# Patient Record
Sex: MALE | Race: WHITE
[De-identification: names, ages, dates, MRNs, and addresses within clinical notes are randomized per-mention and may not be internally consistent; named-entity substitution may affect disease eponyms.]

---

## 2020-05-06 ENCOUNTER — HOSPITAL ENCOUNTER (EMERGENCY)
Dept: HOSPITAL 94 - ER | Age: 80
Discharge: HOME | End: 2020-05-06
Payer: MEDICARE

## 2020-05-06 VITALS — BODY MASS INDEX: 22.81 KG/M2 | WEIGHT: 159.33 LBS | HEIGHT: 70 IN

## 2020-05-06 VITALS — DIASTOLIC BLOOD PRESSURE: 67 MMHG | SYSTOLIC BLOOD PRESSURE: 167 MMHG

## 2020-05-06 DIAGNOSIS — R25.2: Primary | ICD-10-CM

## 2020-05-06 LAB
ALBUMIN SERPL BCP-MCNC: 3.6 G/DL (ref 3.4–5)
ANION GAP SERPL CALCULATED.3IONS-SCNC: 6 MMOL/L (ref 8–16)
BASOPHILS # BLD AUTO: 0.1 X10'3 (ref 0–0.2)
BASOPHILS NFR BLD AUTO: 0.8 % (ref 0–1)
BUN SERPL-MCNC: 19 MG/DL (ref 7–18)
BUN/CREAT SERPL: 24.4 (ref 5.4–32)
CALCIUM SERPL-MCNC: 8.5 MG/DL (ref 8.5–10.1)
CHLORIDE SERPL-SCNC: 107 MMOL/L (ref 99–107)
CO2 SERPL-SCNC: 29.5 MMOL/L (ref 24–32)
CREAT SERPL-MCNC: 0.78 MG/DL (ref 0.6–1.1)
EOSINOPHIL # BLD AUTO: 0.1 X10'3 (ref 0–0.9)
EOSINOPHIL NFR BLD AUTO: 0.8 % (ref 0–6)
ERYTHROCYTE [DISTWIDTH] IN BLOOD BY AUTOMATED COUNT: 15 % (ref 11.5–14.5)
GFR SERPL CREATININE-BSD FRML MDRD: > 90 ML/MIN
GLUCOSE SERPL-MCNC: 232 MG/DL (ref 70–104)
HCT VFR BLD AUTO: 41.2 % (ref 42–52)
HGB BLD-MCNC: 13.4 G/DL (ref 14–17.9)
LYMPHOCYTES # BLD AUTO: 1.3 X10'3 (ref 1.1–4.8)
LYMPHOCYTES NFR BLD AUTO: 10.9 % (ref 21–51)
MCH RBC QN AUTO: 30 PG (ref 27–31)
MCHC RBC AUTO-ENTMCNC: 32.6 G/DL (ref 33–36.5)
MCV RBC AUTO: 92 FL (ref 78–98)
MONOCYTES # BLD AUTO: 1.1 X10'3 (ref 0–0.9)
MONOCYTES NFR BLD AUTO: 9 % (ref 2–12)
NEUTROPHILS # BLD AUTO: 9.2 X10'3 (ref 1.8–7.7)
NEUTROPHILS NFR BLD AUTO: 78.5 % (ref 42–75)
PLATELET # BLD AUTO: 268 X10'3 (ref 140–440)
PMV BLD AUTO: 7.8 FL (ref 7.4–10.4)
POTASSIUM SERPL-SCNC: 3.3 MMOL/L (ref 3.5–5.1)
RBC # BLD AUTO: 4.48 X10'6 (ref 4.7–6.1)
SODIUM SERPL-SCNC: 142 MMOL/L (ref 135–145)
WBC # BLD AUTO: 11.8 X10'3 (ref 4.5–11)

## 2020-05-06 PROCEDURE — 85025 COMPLETE CBC W/AUTO DIFF WBC: CPT

## 2020-05-06 PROCEDURE — 36415 COLL VENOUS BLD VENIPUNCTURE: CPT

## 2020-05-06 PROCEDURE — 80048 BASIC METABOLIC PNL TOTAL CA: CPT

## 2020-05-06 PROCEDURE — 99284 EMERGENCY DEPT VISIT MOD MDM: CPT

## 2020-05-06 NOTE — NUR
Pt's family members brought the patient to the ED for evaluation. Pt was seen 
at [facility] in [place] for tremors in the right upper extremity and he 
appears to be hitting himself repeatedly. Pt's family members report the 
patient was released with a referral for mental health follow up but they want 
him to be evaluated further for medical concerns. Pt's brother-in-law Roderick's 
number given to the triage nurse to reach the him or the spouse regarding more 
details about the patient's situation.

## 2020-07-15 ENCOUNTER — HOSPITAL ENCOUNTER (INPATIENT)
Dept: HOSPITAL 94 - ADULT MH | Age: 80
LOS: 5 days | Discharge: HOME | DRG: 885 | End: 2020-07-20
Attending: PSYCHIATRY & NEUROLOGY | Admitting: PSYCHIATRY & NEUROLOGY
Payer: MEDICARE

## 2020-07-15 VITALS — HEIGHT: 70 IN | BODY MASS INDEX: 23.86 KG/M2 | WEIGHT: 166.67 LBS

## 2020-07-15 VITALS — SYSTOLIC BLOOD PRESSURE: 156 MMHG | DIASTOLIC BLOOD PRESSURE: 78 MMHG

## 2020-07-15 VITALS — SYSTOLIC BLOOD PRESSURE: 162 MMHG | DIASTOLIC BLOOD PRESSURE: 78 MMHG

## 2020-07-15 DIAGNOSIS — Z87.891: ICD-10-CM

## 2020-07-15 DIAGNOSIS — F39: Primary | ICD-10-CM

## 2020-07-15 DIAGNOSIS — R45.851: ICD-10-CM

## 2020-07-15 DIAGNOSIS — M54.9: ICD-10-CM

## 2020-07-15 DIAGNOSIS — E11.9: ICD-10-CM

## 2020-07-15 DIAGNOSIS — Z79.4: ICD-10-CM

## 2020-07-15 DIAGNOSIS — E03.9: ICD-10-CM

## 2020-07-15 DIAGNOSIS — F32.9: ICD-10-CM

## 2020-07-15 DIAGNOSIS — G47.00: ICD-10-CM

## 2020-07-15 DIAGNOSIS — G25.81: ICD-10-CM

## 2020-07-15 PROCEDURE — 87081 CULTURE SCREEN ONLY: CPT

## 2020-07-15 PROCEDURE — 36415 COLL VENOUS BLD VENIPUNCTURE: CPT

## 2020-07-15 PROCEDURE — 83036 HEMOGLOBIN GLYCOSYLATED A1C: CPT

## 2020-07-15 PROCEDURE — 80061 LIPID PANEL: CPT

## 2020-07-15 PROCEDURE — 84443 ASSAY THYROID STIM HORMONE: CPT

## 2020-07-15 PROCEDURE — 99285 EMERGENCY DEPT VISIT HI MDM: CPT

## 2020-07-15 PROCEDURE — 82948 REAGENT STRIP/BLOOD GLUCOSE: CPT

## 2020-07-15 NOTE — NUR
Noted that pt on a CHO controlled diet with [facility] T2DM, A1c pending at this time. 

Will continue to follow and monitor need for DM education pending A1c results.


-------------------------------------------------------------------------------

Addendum: 07/15/20 at 1519 by Martina Bernardo RD

-------------------------------------------------------------------------------

Amended: Links added.

## 2020-07-15 NOTE — NUR
Patient continues to exhibit agitation. Patient complains of not sleeping and gets in a 
verbal argument and dispute with his roommate.  contacted by [facility], orders 
received for Ativan 2 mg, Zyprexa Zytis 10 mg now. This was given. In addition Dr. Prince gave 
an order to repeat the Zyprexa Zytis in one hour prn.

## 2020-07-15 NOTE — NUR
Patients night time glucose was 231. Per day shift RN night time Lantus dose of 30 units is 
to remain that dose, no adjustments. This was verified by Dr. Echeverria and Kathy, Norma 
RN.

## 2020-07-15 NOTE — NUR
ADMIT NOTE



Patient arrived at [facility] at 1445. Patient was escorted via wheelchair with Crystal, PCT and 
security. Patient presents as calm and cooperative. Patient showered and changed into green 
unit scrubs. Belongings were inventoried. Advisement was reviewed and given to patient, pt. 
voices understanding. Patient denies SI, states I was feeling like that because of these 
shakes and pt. shakes hand back and forth. Pt. states they gave me some kind of medicine 
and it helped, but unsure what medication it was. Pt. states I hope they figure it out, 
that is why I am here. Denies H/I, AH/VH. Noted some tremors in hands, but seems to be 
controllable. Pt. states I learned to deep breath and this seems to help. Reports some 
anxiety 4/10. 



Pt. is DM Type 1- he takes Novolog flexpen 4 units before each meal. POC blood glucose was 
165 upon admit to [facility]. Basaglar 30 units HS. Reports trouble falling asleep, recently 
started on Trazadone 100mg HS. Noted pt. to be a little unstable when walking and standing. 
Patient states he sometimes uses a FWW at home. Last BM was 7/14 HS and was hard. Pt. hard 
of hearing, has hearing in left ear.

## 2020-07-16 VITALS — SYSTOLIC BLOOD PRESSURE: 149 MMHG | DIASTOLIC BLOOD PRESSURE: 83 MMHG

## 2020-07-16 VITALS — DIASTOLIC BLOOD PRESSURE: 65 MMHG | SYSTOLIC BLOOD PRESSURE: 104 MMHG

## 2020-07-16 LAB
CHOLEST SERPL-MCNC: 132 MG/DL (ref 0–200)
CHOLEST/HDLC SERPL: 2.4 {RATIO} (ref 0–4.99)
HBA1C MFR BLD: 9.1 % (ref 4.5–6.2)
HDLC SERPL-MCNC: 56 MG/DL (ref 35–60)
LDLC SERPL DIRECT ASSAY-MCNC: 60 MG/DL (ref 50–100)
TRIGL SERPL-MCNC: 83 MG/DL (ref 20–135)

## 2020-07-16 RX ADMIN — Medication PRN G: at 07:36

## 2020-07-16 RX ADMIN — INSULIN LISPRO SCH UNITS: 100 INJECTION, SOLUTION INTRAVENOUS; SUBCUTANEOUS at 18:09

## 2020-07-16 RX ADMIN — LEVOTHYROXINE SODIUM SCH MCG: 175 TABLET ORAL at 07:36

## 2020-07-16 RX ADMIN — INSULIN LISPRO SCH UNITS: 100 INJECTION, SOLUTION INTRAVENOUS; SUBCUTANEOUS at 13:27

## 2020-07-16 RX ADMIN — INSULIN GLARGINE SCH UNIT: 100 INJECTION, SOLUTION SUBCUTANEOUS at 21:00

## 2020-07-16 RX ADMIN — INSULIN LISPRO SCH UNITS: 100 INJECTION, SOLUTION INTRAVENOUS; SUBCUTANEOUS at 07:00

## 2020-07-16 NOTE — NUR
Nursing Progress Note:



Legal hold:5150



Client on involuntary status for: DTS.



Report received from LANDEN Morrison with use of SBAR



Why are they here:

Patient arrived at [facility] at 1445. Patient was escorted via wheelchair with Crystal, PCT and 
security. Patient presents as calm and cooperative. Patient showered and changed into green 
unit scrubs. Belongings were inventoried. Advisement was reviewed and given to patient, pt. 
voices understanding. Patient denies SI, states I was feeling like that because of these 
shakes and pt. shakes hand back and forth. Pt. states they gave me some kind of medicine 
and it helped, but unsure what medication it was. Pt. states I hope they figure it out, 
that is why I am here. Denies H/I, AH/VH. Noted some tremors in hands, but seems to 



Assessment

What has happened this shift: This patient is in his room following shift change. He is 
supine in bed, he has a walker at bedside. Patient stands to greet this writer. Patient 
brings this writers attention to his right hand. Patient tells this writer that " the only 
reason I'm here is to get some rest, and to get my hand tremors under control." The patients 
speech is pressured. He makes direct eye contact. Patient is agitated, he tells this writer 
that he is supposed to get "some sort of medication for sleep, two large pills." The patient 
denies H/I, S/I, or any hallucinations. Patients evening blood sugar was 231. Patient 
reported to have eaten his full dinner. Lantus was given 30 units SQ. Late evening patient 
began experiencing more agitation. An Ativan 1mg was given PO. Trazadone 50 mg was given 
about 45 minutes prior for sleep without effect. Following Ativan this patient began 
exhibiting more agitation, he got into a verbal altercation with his roommate. Dr. Prince was 
contacted. Orders were given for Zyprexa Zytis 10 mg and Ativan 2mg. The patient took those 
medications as requested. The patient gradually went to sleep. Frequent rounding is being 
done to ensure patient safety. 





S/I, H/I: Patient denies. 

A/VH: Patient denies.

Sleep: Will tally at 0500 hours.

ADL's: Independent, patient uses walker.

Group attendance: None on day shift.

Were med's taken: Yes, patient is medication compliant.

Any med S/E: None noted.





Mental Status Exam



Appearance: Somewhat disheveled looking, wearing green scrubs.

Eye contact: Fair.

Behavior: Agitated, angry at times.

Speech: Pressured.

Mood:Depressed.

Affect: Flat.

Thought process: Minimizes need to be here. 

Thought Content: Repetitive statements about just being here to get rest and stop right hand 
tremors.

Cognition: Alert and oriented to place and year, not to situation.

Insight: Poor.

Judgment: Poor.







Interventions



PRN's used: Trazadone, Ativan, Zyprexa Zytis.





Therapeutic interventions: 1:1 assessment, maintained a safe and therapeutic environment, 
provided clear and simple instructions, monitored behavior and need for intervention, 
provided redirection/reassurance as needed, encouragement to perform personal hygiene, limit 
setting, positive reinforcement, Q 15 minute safety checks. 



Restraints/seclusion/emergency medication: N/A





Justification of Continued Inpatient Treatment: Pt continues to require a safe and 
supportive environment with medication adjustments to decrease risk of rehospitalization.

## 2020-07-16 NOTE — NUR
DM Consult: Pt A1C results 9.1. Written DM ed faxed to FRITZ and MARTHA selby/w RN regarding placing 
ed in pt chart so can have written ed materials once appropriate upon discharge.

-------------------------------------------------------------------------------

Addendum: 07/16/20 at 1029 by Lorenzo Lenz RD

-------------------------------------------------------------------------------

Amended: Links added.

## 2020-07-16 NOTE — NUR
Hypoglycemic Episode: Obtained pt's BS this AM and was 52, one Glucose-Shot administered per 
protocol and rechecked pt. in 15 min. BS was 69. Pt. was then able to eat 100% of his 
breakfast, rechecked BS and was 107. Held pt's scheduled 4units of Humalog, will endorse to 
MD and continue to monitor..

## 2020-07-16 NOTE — NUR
Nursing Progress Note:



Legal hold: 5150



Client on involuntary status for DTS



Report received from nurse with use of SBAR: LANDEN Chavez



Why are they here:  Pt. admitted from [facility] on a 5150  for DTS. On admission he 
denies SI, states I was feeling like that because of these shakes and pt. shakes hand back 
and forth. Pt. states they gave me some kind of medicine and it helped, but unsure what 
medication it was. Pt. states I hope they figure it out, that is why I am here. Denies 
H/I, AH/VH. Noted some tremors in hands, but seems to be controllable. Pt. states I learned 
to deep breath and this seems to help. Reports some anxiety 4/10. 



Assessment



What has happened this shift: Received pt. laying in bed awake at the beginning of the 
shift, this writer introduced self and established rapport. Pt. presents as cooperative, 
fatigued, restless, guarded, and withdrawn. 1:1 completed at bedside, pt. is A&O X3, states, 
"I'm here to get rid of my tremors and get my sleep straightened out." Pt. denies any S/I, 
H/I, or A/V/HA, however he does present with some paranoid thoughts. He makes the paranoid 
delusional statement, "Half the population is going to die from this coronavirus I think." 
Pt.'s thought process is circumstantial throughout the assessment, and he continues to 
perseverate on the insomnia he has been experiencing, states, "I lay there, think about 
things, and I can't sleep." Obtained new order from VANE Humphrey for Seroquel 50mg, MRX1 in one 
hour, will endorse to Noc shift.

S/I, H/I: Denies

A/VH: Denies, does not appear internally preoccupied

Sleep: Pt. is very preoccupied with chronic insomnia he has been experiencing for some time, 
however he reports that he was able to sleep last night after taking sleeping medication 
(got one time dose of Zyprexa 10mg and Ativan 2mg). Sleep hours are 6.

ADL's: Uses FWW

Group attendance: Yes

Were meds taken:Yes

Any med S/E:None





Mental Status Exam



Appearance: Neat, appropriately dressed

Eye contact: Good

Behavior: Cooperative, fatigued, restless, guarded, and withdrawn

Speech: Soft, delayed response at times

Mood: Guarded and withdrawn

Affect: Blunted

Thought process: Circumstantial

Thought Content: Paranoid delusional

Cognition: A&O X3 (not to reason here)

Insight: Poor

Judgment: Poor







Interventions



PRN's used: None

Therapeutic interventions: Introduced self and established rapport, ensured contract for 
safety, maintained a safe and therapeutic environment, provided clear and simple 
instruction, monitored behaviors and need for intervention, maintained fall precautions and 
Q 15min safety checks. 

Restraints/seclusion/emergency medication: N/A





Justification of Continued Inpatient Treatment: Pt. requires interruption of current crisis, 
medication adjustments, and a safe and supportive environment.

## 2020-07-16 NOTE — NUR
Patient is awake and ambulatory. Patient complains of agitation and inability to sleep. 
Patient has had Trazatone 100 mg, Seroquel 50 mg, and Ativan 1 mg. VANE Bell was called. 
Orders received for Zyprexa Zytis 5mg MR X1, and Ativan 1mg PO.

## 2020-07-16 NOTE — NUR
Group Therapy:  Process Group



This Clinicians goals for this process group were as follows: (1) Ask scaling questions 
about Patients current anxiety, depression, and irritability symptoms as a check-in. (2) 
Share with Patients psychoeducation about the importance of being able to identify safe, and 
supportive people who can assist them with their mental and emotional needs.  (3) Share 
psychoeducation on interpersonal boundaries and considerations to assist Patients in 
developing the ability to discern which groups and individuals will be helpful in assisting 
them during times of emotional escalation and crisis. (4) Engage Patients in discussion of 
the topics discussed within the group milieu.

 

Patient arrived to the group milieu approximately 10 minutes after the start of the group.  
He ambulated with the assistance of a walker.  Patient entered the milieu and began talking 
to this Clinician, which interrupted the flow of the discussion during the process group.  
Per this Clinician's impression, Patient's speech was difficult to understand.  Due to this, 
this Clinician had to get closer to Patient and ask him to repeat himself several times.  
Patient asked what the focus of the group was.  This Clinician stated it was about 
developing awareness of interpersonal boundaries and questions that Patients could ask of 
others to recruit safe people who could support them in their mental health journey.  
Patient then asked if the group was about the, "Lord."  This Clinician said that it was not 
about the Lord.  Patient uttered a few more verbalizations that this Clinician did not 
understand and quietly left the room.  Patient did not provide answers to scaling questions 
about his depression, anxiety, and irritability symptoms, nor did he participate in 
discussion within the group milieu.  



Oscar Bueno MA, NAEEM


-------------------------------------------------------------------------------

Addendum: 07/16/20 at 1111 by Oscar Buneo SS

-------------------------------------------------------------------------------

Amended: Links added.

## 2020-07-17 VITALS — DIASTOLIC BLOOD PRESSURE: 74 MMHG | SYSTOLIC BLOOD PRESSURE: 153 MMHG

## 2020-07-17 VITALS — DIASTOLIC BLOOD PRESSURE: 66 MMHG | SYSTOLIC BLOOD PRESSURE: 141 MMHG

## 2020-07-17 RX ADMIN — INSULIN LISPRO SCH UNITS: 100 INJECTION, SOLUTION INTRAVENOUS; SUBCUTANEOUS at 13:32

## 2020-07-17 RX ADMIN — INSULIN GLARGINE SCH UNIT: 100 INJECTION, SOLUTION SUBCUTANEOUS at 21:42

## 2020-07-17 RX ADMIN — LEVOTHYROXINE SODIUM SCH MCG: 175 TABLET ORAL at 07:09

## 2020-07-17 RX ADMIN — Medication PRN G: at 07:20

## 2020-07-17 RX ADMIN — INSULIN LISPRO SCH UNITS: 100 INJECTION, SOLUTION INTRAVENOUS; SUBCUTANEOUS at 08:47

## 2020-07-17 NOTE — NUR
Nursing Progress Note:



Legal hold: 5150



Client on involuntary status for DTS



Report received from nurse with use of SBAR: Shilpi Workman RN



Why are they here:  Pt. admitted from [facility] on a 5150 for DTS after reporting that 
he felt stressed out r/t uncontrollable body movements and wished he would would die. Per 
report, pt. had tried to hang himself, but was unable to tie the noose. On admission pt. 
denied SI, stated, I was feeling like that because of these shakes and pt. shakes hand 
back and forth. Pt. states they gave me some kind of medicine and it helped, but unsure 
what medication it was. Pt. states I hope they figure it out, that is why I am here. 
Denies H/I, AH/VH. Noted some tremors in hands, but seems to be controllable. Pt. states I 
learned to deep breath and this seems to help. Reports some anxiety 4/10. 



Assessment



What has happened this shift: Received pt. sleeping in bed at the beginning of the shift, 
awoke to obtain BS and pt. was again hypoglycemic (see previous note), despite HS Lantus 
being held. Pt. continues to present as cooperative, however is fatigued and withdrawn, 
reports he again had trouble sleeping last night and did not fall asleep until early this 
morning. 1:1 completed at bedside, pt. continues to deny any S/I, states, "That was a 
mistake, they gave me some new medication and it gave me the shakes, that's why I said 
that." Pt. does not recall what medication this was, however he denies any current tremors 
and none observed by this writer. Pt. also denies any depression or anxiety, but continues 
to perseverate on insomnia, and his thought process remains circumstantial. No delusional 
statements made this shift, and pt. encouraged to stay up during the day and not to nap 
because this will help him to sleep better at night, he reported understanding. He ambulates 
intermittently using FWW throughout the shift, and attends all meals interacting minimally 
with others. 

This writer later spoke to pt's wife on the telephone with his consent. She reports that the 
pt. recently saw a neurologist in [place], who encouraged him to follow-up with a 
counselor in order to re-program his brain to be able to go to sleep. Will endorse this 
information to Dr. Plunkett, along with Og Felix's request to speak with the MD. 

S/I, H/I: Denies

A/VH: Denies, does not appear internally preoccupied

Sleep: Pt. reports he again had trouble sleeping last night, and was not able to fall asleep 
until early this morning. However, sleep hours are 7.

ADL's: Uses FWW

Group attendance: No

Were meds taken:Yes

Any med S/E:None





Mental Status Exam



Appearance: Neat, appropriately dressed

Eye contact: Good

Behavior: Cooperative, fatigued, guarded, and withdrawn

Speech: Soft, delayed response at times (pt. is [place])

Mood: Pleasant, however withdrawn

Affect: Blunted

Thought process: Circumstantial

Thought Content: WNL, with some confusion at times (pt. is not able to state why he is here)

Cognition: A&O X3 (not to reason here)

Insight: Poor

Judgment: Poor







Interventions



PRN's used: None

Therapeutic interventions: Ensured contract for safety, maintained a safe and therapeutic 
environment, provided clear and simple instructions, monitored behaviors and need for 
intervention, monitored BS and administered insulin per orders, encouraged pt. not to nap 
during the day to help improve his sleep at night, maintained fall precautions and Q 15min 
safety checks. 

Restraints/seclusion/emergency medication: N/A





Justification of Continued Inpatient Treatment: Pt. requires interruption of current crisis, 
medication adjustments, and a safe and supportive environment.

## 2020-07-17 NOTE — NUR
Nursing Progress Note:



Legal hold: 5150



Client on involuntary status for DTS



Report received from LANDEN Morrison with use of SBAR.



Why are they here:  Pt. admitted from [facility] on a 5150  for DTS. On admission he 
denies SI, states I was feeling like that because of these shakes and pt. shakes hand back 
and forth. Pt. states they gave me some kind of medicine and it helped, but unsure what 
medication it was. Pt. states I hope they figure it out, that is why I am here. Denies 
H/I, AH/VH. Noted some tremors in hands, but seems to be controllable. Pt. states I learned 
to deep breath and this seems to help. Reports some anxiety 4/10. 



Assessment



What has happened this shift: Patient is resting in his room after shift change. Patient is 
oriented to person, place, time, not to situation. Patient states he slept well the previous 
night, he states he is concerned about not being to sleep once again. This patient is still 
concerned about his tremors in his right hand. Patient minimizes questions about any 
psychological difficulties. Patient makes good eye contact and is friendly. At night time 
medication pass patient once again expresses concern about not being able to sleep. After 
doses of seroquel and ativan this patient is still wide awake. Consult with PA by silvestre. 
Orders received for Zyprexa Zytis given at 5 mg PO in conjunction with Ativan 1 mg PO. The 
Zyprexa Zytis was repeated once. The patient finally surrendered to sleep. Following dinner 
and snacks patients glucose check was 88, the night time Lantus was held.



S/I, H/I: Denies.

A/VH: Denies, does not appear internally preoccupied

Sleep: Pt required Ativan, Seroquel, Trazadone, then Zyprexa to get to sleep. Will tally 
hours at 0500 hours. 

ADL's: Independent. Patient uses a walker. 

Group attendance: Yes, on the day shift. 

Were meds taken:Yes, patient is medication compliant.

Any med S/E:None noted or observed. 





Mental Status Exam



Appearance: Neat, appropriately dressed,

Eye contact: Good,

Behavior: Cooperative, self isolates, paranoia. 

Speech: Soft, patient mumbles. 

Mood: Guarded and withdrawn.

Affect: Blunted.

Thought process: Circumstantial.

Thought Content: Paranoid delusional.

Cognition: A&O X3. Not to situation. 

Insight: Poor.

Judgment: Poor.







Interventions:



PRN's used: None

Therapeutic interventions: Introduced self and established rapport, ensured contract for 
safety, maintained a safe and therapeutic environment, provided clear and simple 
instruction, monitored behaviors and need for intervention, maintained fall precautions and 
Q 15min safety checks. 

Restraints/seclusion/emergency medication: N/A





Justification of Continued Inpatient Treatment: Pt. requires interruption of current crisis, 
medication adjustments, and a safe and supportive environment.

## 2020-07-17 NOTE — NUR
Patient ambulates to nursing station. Complaining of not being able to sleep still. A MR 
order of Bre Keenan will be given.

## 2020-07-17 NOTE — NUR
Hypoglycemic Episode: Obtained pt's AM BS and was 60, per protocol administered 15ML of oral 
dextrose with a protein snack. Rechecked pt's BS in 15 minutes and was 120, will endorse to 
MD and continue to monitor.

## 2020-07-17 NOTE — NUR
****REGARDING HS LANTUS: PER DR. BEARD, CALL MD BEFORE GIVING HS LANTUS DUE TO EXTREMELY 
LOW AM BLOOD SUGARS. Also, pt's Humalog to be given with dinner has been reduced to 3 units, 
and pt. has been ordered a sandwich for HS.

## 2020-07-17 NOTE — NUR
[facility]



SS met w/pt and engaged him in dcp activities. Per discussion, pt reports no SI/HI/delusions 
and is looking forward to returning home. Pt reports that he discovered that he enjoys 
reading during his stay here and plans to continue this activity @ home as he finds that it 
is relaxing. 



SS had t/c w/pt's wife & engaged her in [facility] activity & safety planning. Per discussion, pt's 
wife reports that she manages his meds and administer them at home, she also indicated they 
have family & friends nearby as well as the support of their Taoist community. She will call 
pt's PMD's office on Monday to schedule a post-hospital follow-up for pt. 



Plan: 

Pt 5150 expires on the 18th, if pt is d/c, wife can be contacted to arrange transportation 
home for pt. 

Yoly Lynn LCSW

-------------------------------------------------------------------------------

Addendum: 07/17/20 at 1816 by Yoly ESCOBAR

-------------------------------------------------------------------------------

Amended: Links added.

## 2020-07-18 VITALS — SYSTOLIC BLOOD PRESSURE: 129 MMHG | DIASTOLIC BLOOD PRESSURE: 54 MMHG

## 2020-07-18 VITALS — DIASTOLIC BLOOD PRESSURE: 61 MMHG | SYSTOLIC BLOOD PRESSURE: 132 MMHG

## 2020-07-18 RX ADMIN — INSULIN LISPRO SCH UNITS: 100 INJECTION, SOLUTION INTRAVENOUS; SUBCUTANEOUS at 07:38

## 2020-07-18 RX ADMIN — INSULIN GLARGINE SCH UNIT: 100 INJECTION, SOLUTION SUBCUTANEOUS at 20:44

## 2020-07-18 RX ADMIN — INSULIN LISPRO SCH UNITS: 100 INJECTION, SOLUTION INTRAVENOUS; SUBCUTANEOUS at 18:10

## 2020-07-18 RX ADMIN — INSULIN LISPRO SCH UNITS: 100 INJECTION, SOLUTION INTRAVENOUS; SUBCUTANEOUS at 12:30

## 2020-07-18 RX ADMIN — LEVOTHYROXINE SODIUM SCH MCG: 175 TABLET ORAL at 07:17

## 2020-07-18 NOTE — NUR
Nursing Progress Note:



Legal hold: 5150



Client on involuntary status for DTS



Report received from LANDEN Anderson with use of SBAR.



Why are they here:  Pt. admitted from [facility] on a 5150  for DTS. On admission he 
denies SI, states I was feeling like that because of these shakes and pt. shakes hand back 
and forth. Pt. states they gave me some kind of medicine and it helped, but unsure what 
medication it was. Pt. states I hope they figure it out, that is why I am here. Denies 
H/I, AH/VH. Noted some tremors in hands, but seems to be controllable. Pt. states I learned 
to deep breath and this seems to help. Reports some anxiety 4/10. 



Assessment



What has happened this shift: 

Patient is isolating in his room, he is resting in bed, low fowlers position, he plays 
solitaire. 1:1 Interview: This patient is focused on getting the right medications to sleep 
at night. I need good rest.  The patient is upbeat, his thought process is more linear. 
The patient states he ate a full meal at dinner. Dr. Plunkett spoke with patients wife, per 
Dr. WAGGONER the patients wife advised that the patient has to have a sandwich prior to bedtime or 
not take Lantus. Per Dr. Plunkett this patient was given a turkey sandwich (38 Grams of 
Complex Carbs) at 2030 hours. At 2100 hours patients glucose was 328. Dr. WAGGONER was called at 
home and advised, per Dr WAGGONER the 2100 Lantus dose is lowered from 25 Units to 20 Units. 
Patient was also given Zyprexa 10 mg PO, and Ativan 1 mg PO. If patient does not sleep the 
PO Ativan will be repeated. The patient ambulates well with the use of his walker. He is 
denying any S/I or H/I tonight. 



00:15 hours: Patient is sitting up at bedside. Patient states he cant sleep. Dr. WAGGONER 
contacted by [facility]. Orders received for Trazadone 100 mg. This will be administered.

Patient is medication compliant with Trazadone adm. Patient was given a teaching about 
orthostatic hypotension as a possible side affect of Trazadone. The patient agrees if he 
gets up tonight from bed he will slowly adjust from sitting to standing. 



S/I, H/I: Denies.

A/VH: Denies, does not appear internally preoccupied

Sleep: Patient is resistant to sleep. See notes.

ADL's: Independent. Patient uses a walker. 

Group attendance: N/A. Night shift.

Were meds taken:Yes, patient is medication compliant.

Any med S/E: None noted or observed. 





Mental Status Exam



Appearance: Neat, appropriately dressed, green scrub top, chartreuse color pants.

Eye contact: Good.

Behavior: Cooperative, self isolates, paranoia. 

Speech: Soft, patient mumbles. 

Mood: Guarded and withdrawn.

Affect: Blunted.

Thought process: Circumstantial, some improvement from previous night.

Thought Content: Paranoid, focused on not sleeping.

Cognition: A&O X3. Not to situation. 

Insight: Poor.

Judgment: Poor.







Interventions:



PRN's used: Trazadone, Ativan

Therapeutic interventions: Introduced self and established rapport, ensured contract for 
safety, maintained a safe and therapeutic environment, provided clear and simple 
instruction, monitored behaviors and need for intervention, maintained fall precautions and 
Q 15min safety checks. 

Restraints/seclusion/emergency medication: N/A





Justification of Continued Inpatient Treatment: Pt. requires interruption of current crisis, 
medication adjustments, and a safe and supportive environment.

## 2020-07-18 NOTE — NUR
Blood Sugars: Obtained pt's blood sugar this AM and was 378, ordered 4 units of Lantus 
administered and BS reassessed in approximately 30 min and was 373. Pt. ate 100% of his 
breakfast, and Dr. Plunkett notified regarding pt's elevated BS, obtained order for 10 
units of Humalog to be given, administered at approximately 0845. Pt's BS was reassessed at 
approximately 0930 and was 321. Dr. Plunkett again notified regarding continued 
hypoglycemia, order for an additional 6 units of Humalog to be administered. BS reassessed 
approximately 30min later and was 65. Juice and carbohydrate/protein snack administered. BS 
reassessed and was 60. Additional juice administered, BS reassessed and was 84. Again, 
assessed pt's BS just before lunch at approximately 1235 and was 23, pt. was talking and 
able to swallow, however reported feeling weak. 30mL of oral glucose administered and pt. 
ate 100% of lunch, BS rechecked in 15min per protocol and was 89. Dr. Plunkett advised, 
will continue to monitor.

## 2020-07-18 NOTE — NUR
INSULIN ORDERS: Obtained orders from Dr. Plunkett to change Humalog back to 4 units with 
meals and Lantus to 30 units at HS, as this is pt's home regimen. However, Lantus only to be 
given if pt. has an HS snack, turkey sandwiches ordered, will endorse to Noc shift.

## 2020-07-18 NOTE — NUR
Nursing Progress Note:



Legal hold: 5150



Client on involuntary status for DTS



Report received from nurse with use of SBAR: Shilpi Workman RN



Why are they here:  Pt. admitted from [facility] on a 5150 for DTS after reporting that 
he felt stressed out r/t uncontrollable body movements and wished he would would die. Per 
report, pt. had tried to hang himself, but was unable to tie the noose. On admission pt. 
denied SI, stated, I was feeling like that because of these shakes and pt. shakes hand 
back and forth. Pt. states they gave me some kind of medicine and it helped, but unsure 
what medication it was. Pt. states I hope they figure it out, that is why I am here. 
Denies H/I, AH/VH. Noted some tremors in hands, but seems to be controllable. Pt. states I 
learned to deep breath and this seems to help. Reports some anxiety 4/10. 



Assessment



What has happened this shift: Received pt. sleeping in bed at the beginning of the shift, 
awoke to obtain BS and pt. was hyperglycemic (see previous note), Dr. Plunkett notified. 
1:1 completed at bedside, pt. continues to deny any S/I and also denies any depression or 
anxiety. He reports he only became suicidal when they gave him a new medication and this 
caused him to have tremors which made it difficult for him to do anything, even to eat. 
However, pt. denies any further tremors and none are observed. Pt. states, "I'm looking 
forward to getting home to see my babies" (referring to his dogs). Thought process appears 
to be more linear, and no delusional statements made this shift. Pt. again reported 
difficulty sleeping r/t the medications taking a while to take effect, states, "I just lay 
there thinking about all of the things I have to do." Again, encouraged pt. to stay up 
during the day and not to nap because this will help him to sleep better at night, he 
reported understanding and ambulated intermittently using FWW throughout the shift. Pt. 
remains cooperative and pleasant, however continues to be withdrawn interacting minimally 
with others. 

S/I, H/I: Denies

A/VH: Denies, does not appear internally preoccupied

Sleep: Pt. again reported difficulty sleeping r/t the medications taking a while to take 
effect, states, "I just lay there thinking about all of the things I have to do." Sleep 
hours are 1.

ADL's: Uses FWW

Group attendance: No

Were meds taken:Yes

Any med S/E:None





Mental Status Exam



Appearance: Neat, appropriately dressed

Eye contact: Good

Behavior: Cooperative, fatigued, guarded, and withdrawn

Speech: Soft, delayed response at times (pt. is [place])

Mood: Pleasant, however withdrawn

Affect: Blunted

Thought process: More linear, less circumstantial

Thought Content: WNL, with some confusion at times (pt. is not able to state why he is here)

Cognition: A&O X3 (not to reason here)

Insight: Fair

Judgment: Fair







Interventions



PRN's used: None

Therapeutic interventions: Ensured contract for safety, maintained a safe and therapeutic 
environment, provided clear and simple instructions, monitored behaviors and need for 
intervention, monitored BS and obtained orders to treat hyperglycemia as needed, encouraged 
pt. not to nap during the day to help improve his sleep at night, maintained fall 
precautions and Q 15min safety checks. 

Restraints/seclusion/emergency medication: N/A





Justification of Continued Inpatient Treatment: Pt. continues to require medication 
adjustments, and a safe and supportive environment. 

-------------------------------------------------------------------------------

Addendum: 07/18/20 at 1327 by Leidy Serrano RN

-------------------------------------------------------------------------------

At approximately 1330, pt. began having tremors in his rt. hand which made him feel slightly 
anxious, he states, "It will start there and sometimes go to my whole body." PRN Ativan 
administered, will continue to monitor.

## 2020-07-19 VITALS — SYSTOLIC BLOOD PRESSURE: 150 MMHG | DIASTOLIC BLOOD PRESSURE: 74 MMHG

## 2020-07-19 VITALS — DIASTOLIC BLOOD PRESSURE: 85 MMHG | SYSTOLIC BLOOD PRESSURE: 142 MMHG

## 2020-07-19 RX ADMIN — INSULIN LISPRO SCH UNITS: 100 INJECTION, SOLUTION INTRAVENOUS; SUBCUTANEOUS at 17:44

## 2020-07-19 RX ADMIN — INSULIN LISPRO SCH UNITS: 100 INJECTION, SOLUTION INTRAVENOUS; SUBCUTANEOUS at 07:39

## 2020-07-19 RX ADMIN — LEVOTHYROXINE SODIUM SCH MCG: 175 TABLET ORAL at 07:37

## 2020-07-19 RX ADMIN — INSULIN GLARGINE SCH UNIT: 100 INJECTION, SOLUTION SUBCUTANEOUS at 20:47

## 2020-07-19 RX ADMIN — INSULIN LISPRO SCH UNITS: 100 INJECTION, SOLUTION INTRAVENOUS; SUBCUTANEOUS at 12:50

## 2020-07-19 NOTE — NUR
Nursing Progress Note:



Legal hold: Voluntary for DTS



Report received from DAPHNEY De Leon with use of SBAR



Why are they here:  Pt. admitted from [facility] on a 5150 for DTS after reporting that 
he felt stressed out r/t uncontrollable body movements and wished he would would die. Per 
report, pt. had tried to hang himself, but was unable to tie the noose. On admission pt. 
denied SI, stated, I was feeling like that because of these shakes and pt. shakes hand 
back and forth. Pt. states they gave me some kind of medicine and it helped, but unsure 
what medication it was. Pt. states I hope they figure it out, that is why I am here. 
Denies H/I, AH/VH. Noted some tremors in hands, but seems to be controllable. Pt. states I 
learned to deep breath and this seems to help. Reports some anxiety 4/10. 



Assessment



What has happened this shift: Received patient sleeping in bed at shift change, no distress 
noted. Pt. roused for breakfast. AM blood sugar was 311. Pt. denies SI/AH/VH. Pt. happy with 
how he slept last night even though his roommate kept him awake due to his snoring. Reports 
no hand tremors or neuropathy pain he r/t sleeping better.  Sleep Assessment shows 7.75 
hours. Pt. is compliant with care and medications. Pt. eats all meals in group room. Pt. is 
observed walking in vale with his FWW, but otherwise isolates to his room.  Pt. was tearful 
states he may have to put one of his dogs down; "he has diabetes like me." Reported headache 
8/10. Blood sugar was 94. Administered Tylenol 650mg with effect. PM blood sugar was 167. 
Pt. states "I think I get to go home tomorrow." 



S/I, H/I: Denies both. 

A/VH: Denies both. 

Sleep: 7.75 hrs. per Sleep Assessment. Pt. reports "feeling he slept better." 

ADL's: Independent; Uses FWW

Group attendance: No group on Sunday.

Were meds taken: Yes, without hesitation

Any med S/E: Denies. 





Mental Status Exam



Appearance: Neat, clean, wearing green unit scrubs. 

Eye contact: Good

Behavior: Cooperative, calm

Speech: Soft, a little muffled, only has top dentures. 

Mood: Pleasant

Affect: Congruent with mood.

Thought process: Linear

Thought Content: May have to put his dog down.

Cognition: A&O x4

Insight: Fair

Judgment: Fair







Interventions



PRN's used: Tylenol



Therapeutic interventions: 1:1 therapeutic assessment,  maintained a safe and therapeutic 
environment, provided clear and simple instructions, monitored behaviors and need for 
intervention, monitored BS  encouraged pt. not to nap during the day to help improve his 
sleep at night, maintained fall precautions and Q 15min safety checks. 



Restraints/seclusion/emergency medication: N/A





Justification of Continued Inpatient Treatment: Patient continues to require medication 
adjustments in  a safe and supportive environment.

## 2020-07-19 NOTE — NUR
Nursing Progress Note:



Legal hold: 5150



Client on involuntary status for DTS



Report received from nurse with use of SBAR: Monica RN



Why are they here:  Pt. admitted from [facility] on a 5150 for DTS after reporting that 
he felt stressed out r/t uncontrollable body movements and wished he would would die. Per 
report, pt. had tried to hang himself, but was unable to tie the noose. On admission pt. 
denied SI, stated, I was feeling like that because of these shakes and pt. shakes hand 
back and forth. Pt. states they gave me some kind of medicine and it helped, but unsure 
what medication it was. Pt. states I hope they figure it out, that is why I am here. 
Denies H/I, AH/VH. Noted some tremors in hands, but seems to be controllable. Pt. states I 
learned to deep breath and this seems to help. Reports some anxiety 4/10. 



Assessment



What has happened this shift: Pt stayed in his room most of the shift. He said his room mate 
is wakening him with his loud voice. Pt given ear plugs to help muffle the sound. Pt ate his 
turkey sandwhich for snack. His Bs was 409 and 30 units given. Pt was awakened kylee by his 
room mate Prn ativan given and helpful.

S/I, H/I: Denies

A/VH: Denies, does not appear internally preoccupied

Sleep: Pt. again reported difficulty sleeping r/t the medications taking a while to take 
effect, states, "I just lay there thinking about all of the things I have to do." Sleep 
hours are 1.

ADL's: Uses FWW

Group attendance: No

Were meds taken:Yes

Any med S/E:None





Mental Status Exam



Appearance: Neat, appropriately dressed

Eye contact: Good

Behavior: Cooperative, fatigued, guarded, and withdrawn

Speech: Soft, delayed response at times (pt. is [place])

Mood: Pleasant, however withdrawn

Affect: Blunted

Thought process: More linear, less circumstantial

Thought Content: WNL, with some confusion at times (pt. is not able to state why he is here)

Cognition: A&O X3 (not to reason here)

Insight: Fair

Judgment: Fair







Interventions



PRN's used: None

Therapeutic interventions: Ensured contract for safety, maintained a safe and therapeutic 
environment, provided clear and simple instructions, monitored behaviors and need for 
intervention, monitored BS and obtained orders to treat hyperglycemia as needed, encouraged 
pt. not to nap during the day to help improve his sleep at night, maintained fall 
precautions and Q 15min safety checks. 

Restraints/seclusion/emergency medication: N/A





Justification of Continued Inpatient Treatment: Pt. continues to require medication 
adjustments, and a safe and supportive environment. 


-------------------------------------------------------------------------------

Addendum: 07/19/20 at 0512 by Moises Martinez RN

-------------------------------------------------------------------------------

bs at 0500 348

## 2020-07-20 VITALS — DIASTOLIC BLOOD PRESSURE: 94 MMHG | SYSTOLIC BLOOD PRESSURE: 144 MMHG

## 2020-07-20 RX ADMIN — LEVOTHYROXINE SODIUM SCH MCG: 175 TABLET ORAL at 07:22

## 2020-07-20 RX ADMIN — INSULIN LISPRO SCH UNITS: 100 INJECTION, SOLUTION INTRAVENOUS; SUBCUTANEOUS at 12:33

## 2020-07-20 RX ADMIN — INSULIN LISPRO SCH UNITS: 100 INJECTION, SOLUTION INTRAVENOUS; SUBCUTANEOUS at 07:53

## 2020-07-20 NOTE — NUR
Initial: Great appetite, 100% PO intake carb controlled diet. Noted that 

on 7/18 patient's blood glucose was as low as 23 after two humalog doses 

and that day BG went up to 402 mg/dl, the insulin doses have been revised 

to correlate with home dose per RN notes. BG 7/19 range between  

mg/dl. Pt is on the hyperglycemia protocol. 

Recommend: 

1. continue carb controlled diet

2. bowel care as needed

3. weekly wts

-------------------------------------------------------------------------------

Addendum: 07/20/20 at 1209 by Melanie Edwards RD

-------------------------------------------------------------------------------

Amended: Links added.

## 2020-07-20 NOTE — NUR
Nursing Progress Note:



Legal hold: Voluntary for DTS



Report received from LANDEN Anderson with use of SBAR



Why are they here:  Pt. admitted from [facility] on a 5150 for DTS after reporting that 
he felt stressed out r/t uncontrollable body movements and wished he would would die. Per 
report, pt. had tried to hang himself, but was unable to tie the noose. On admission pt. 
denied SI, stated, I was feeling like that because of these shakes and pt. shakes hand 
back and forth. Pt. states they gave me some kind of medicine and it helped, but unsure 
what medication it was. Pt. states I hope they figure it out, that is why I am here. 
Denies H/I, AH/VH. Noted some tremors in hands, but seems to be controllable. Pt. states I 
learned to deep breath and this seems to help. Reports some anxiety 4/10. 



Assessment



What has happened this shift: Pt awake and in his room at shift change complaining about 
room mate. wakening him up. Pt reports feeling increased anxiety. Prn Ativan and Elavil 
giveen with meds. Pt HS Bs 185 he ate his Turkey sandwich at snack time. Prn's effective pt 
sleeping.

S/I, H/I: Denies both. 

A/VH: Denies both. 

Sleep: See sleep hrs.

ADL's: Independent; Uses FWW

Group attendance: No group on Sunday.

Were meds taken: Yes, without hesitation

Any med S/E: Denies. 





Mental Status Exam



Appearance: Neat, clean, wearing green unit scrubs. 

Eye contact: Good

Behavior: Cooperative, calm

Speech: Soft, a little muffled, only has top dentures. 

Mood: Pleasant

Affect: Congruent with mood.

Thought process: Linear

Thought Content: May have to put his dog down.

Cognition: A&O x4

Insight: Fair

Judgment: Fair







Interventions



PRN's used: Ativan Elivil

Therapeutic interventions: 1:1 therapeutic assessment,  maintained a safe and therapeutic 
environment, provided clear and simple instructions, monitored behaviors and need for 
intervention, monitored BS  encouraged pt. not to nap during the day to help improve his 
sleep at night, maintained fall precautions and Q 15min safety checks. 



Restraints/seclusion/emergency medication: N/A





Justification of Continued Inpatient Treatment: Patient continues to require medication 
adjustments in  a safe and supportive environment.

## 2020-07-20 NOTE — NUR
Group Therapy:  Process Group



This Clinicians goals for this process group were as follows: (1) Ask scaling questions 
about Patients current anxiety, depression, and irritability symptoms as a check-in. (2) 
Share psychoeducation about self-efficacy, and ego strength. (3) Provide psychoeducation on 
[employer] Drama triangleVictim, Persecutor, Rescuer dynamic. (4) Share psychoeducation on 
developing positive ego strengthpositive affirmations, positive self-talk, transitioning 
from a victim of circumstances to a survivor of circumstances. (5) Process Clients thoughts 
and reflections on this topic within the group milieu.  



Patient was present initially during the process group.  Patient moved with the assistance 
of a walker.  He was dressed in [facility] scrubs.  This Clinician attempted to verbally 
engage Patient by greeting him, but Patient did not respond to him.  This Clinician 
addressed him again.  This time, Patient showed that he had taken his hearing aids out, and 
apparently did not hear this Clinician.  Patient quietly kristine and left with the assistance 
of his walker approximately 5 minutes after the start of the process group, and did not 
return.  Patient did not participate in the discussion of ego strength, [employer]'s drama 
triangle, and positive affirmation, not did he share answers to the scaling questions on his 
level of depression, anxiety, and anger/irritability.  



Oscar Bueno MA, NAEEM


-------------------------------------------------------------------------------

Addendum: 07/20/20 at 1132 by Oscar Bueno SS

-------------------------------------------------------------------------------

Amended: Links added.

## 2020-07-20 NOTE — NUR
DISCHARGE NOTE:



Patient discharged from [facility] at . Patient was escorted to lobby by CHRIS Resendiz.  Patient was 
picked up by [facility] cab and will be transported to his home in [place] where his wife will be 
waiting. Reviewed discharge instructions with patient.  Patient provided with resources. 
Personal belongings returned to patient. Patient was A&Ox4, very excited. Patient states he 
is going to go home and sit in his new recliner "I only had it one week before I came in 
here." Patient appreciative of the care he received at [facility].